# Patient Record
Sex: FEMALE | Race: WHITE
[De-identification: names, ages, dates, MRNs, and addresses within clinical notes are randomized per-mention and may not be internally consistent; named-entity substitution may affect disease eponyms.]

---

## 2019-03-28 ENCOUNTER — HOSPITAL ENCOUNTER (OUTPATIENT)
Dept: HOSPITAL 75 - RAD FS | Age: 34
End: 2019-03-28
Attending: PHYSICIAN ASSISTANT
Payer: MEDICAID

## 2019-03-28 DIAGNOSIS — R10.11: Primary | ICD-10-CM

## 2019-03-28 DIAGNOSIS — Z90.49: ICD-10-CM

## 2019-03-28 PROCEDURE — 74177 CT ABD & PELVIS W/CONTRAST: CPT

## 2019-03-28 NOTE — DIAGNOSTIC IMAGING REPORT
PROCEDURE: CT abdomen and pelvis with contrast.



TECHNIQUE: Multiple contiguous axial images were obtained through

the abdomen and pelvis after administration of intravenous

contrast. Auto Exposure Controls were utilized during the CT exam

to meet ALARA standards for radiation dose reduction. 



DATE: March 28, 2019.



COMPARISON: CT chest, abdomen and pelvis, January 28, 2014. 



INDICATION: 33-year-old female, right upper quadrant abdominal

pain for one year.



FINDINGS: There is minimal dependent atelectasis in the lower

lobes. The heart is not enlarged. There is no identified

pericardial effusion.



The liver is normal in size and contour. There is an elongated

left lobe of the liver which is a normal variant appearance.

There is no identified liver lesion. The main, right and left

portal veins are patent. The patient is status post

cholecystectomy. There is no intrahepatic or extrahepatic bile

duct dilation. The main pancreatic duct is not abnormally

dilated. Unremarkable appearance of the pancreatic parenchyma.

The spleen is normal in size. The adrenal glands are

unremarkable.



There is a 5 mm low-attenuation right renal lesion on delayed

axial image 45, too small to characterize. Urinary collecting

systems are not distended. There is no identified renal or

ureteral stone. The urinary bladder is grossly unremarkable in

appearance.



The uterus is not seen and may be surgically absent.



There are surgical clips in the right lower quadrant. The

appendix is not well seen. There are no secondary findings to

suggest acute appendicitis. There is no free intraperitoneal air.

There is no drainable fluid collection. There is no free pelvic

fluid.



There is no identified abnormally enlarged lymph node in the

abdomen or pelvis which meets CT size criteria for adenopathy.



There is no identified acute bony abnormality.



IMPRESSION: 

CT abdomen and pelvis: No identified acute abnormality in the

abdomen or pelvis.



Dictated by: 



  Dictated on workstation # [id number]

## 2019-04-13 ENCOUNTER — HOSPITAL ENCOUNTER (EMERGENCY)
Dept: HOSPITAL 75 - ER FS | Age: 34
Discharge: HOME | End: 2019-04-13
Payer: MEDICAID

## 2019-04-13 VITALS — DIASTOLIC BLOOD PRESSURE: 68 MMHG | SYSTOLIC BLOOD PRESSURE: 101 MMHG

## 2019-04-13 VITALS — HEIGHT: 64 IN | WEIGHT: 150 LBS | BODY MASS INDEX: 25.61 KG/M2

## 2019-04-13 DIAGNOSIS — R07.9: Primary | ICD-10-CM

## 2019-04-13 DIAGNOSIS — Z88.7: ICD-10-CM

## 2019-04-13 DIAGNOSIS — Z88.5: ICD-10-CM

## 2019-04-13 DIAGNOSIS — Z90.710: ICD-10-CM

## 2019-04-13 DIAGNOSIS — Z87.440: ICD-10-CM

## 2019-04-13 DIAGNOSIS — Z87.448: ICD-10-CM

## 2019-04-13 DIAGNOSIS — F32.9: ICD-10-CM

## 2019-04-13 DIAGNOSIS — F41.9: ICD-10-CM

## 2019-04-13 DIAGNOSIS — Z87.442: ICD-10-CM

## 2019-04-13 DIAGNOSIS — Z88.8: ICD-10-CM

## 2019-04-13 DIAGNOSIS — Z88.6: ICD-10-CM

## 2019-04-13 DIAGNOSIS — J45.909: ICD-10-CM

## 2019-04-13 DIAGNOSIS — Z87.19: ICD-10-CM

## 2019-04-13 PROCEDURE — 71046 X-RAY EXAM CHEST 2 VIEWS: CPT

## 2019-04-13 NOTE — XMS REPORT
Continuity of Care Document

 Created on: 2013



MARA DESIR

External Reference #: [id number]

: 1985

Sex: Female



Demographics







 Address  [address] [place]  [address]

 

 Home Phone  [phone]

 

 Preferred Language  Unknown

 

 Marital Status  Unknown

 

 Hinduism Affiliation  Unknown

 

 Race  Unknown

 

 Ethnic Group  Unknown





Author







 Author  [facility]

 

 Organization  [facility]

 

 Address  Unknown

 

 Phone  Unavailable







Care Team Providers







 Care Team Member Name  Role  Phone

 

 NO, LOCAL PHYSICIAN  PP  Unavailable



                                            



Insurance Providers

                      





 Payer Name                    Policy Number                    Subscriber Name
                    Relationship                

 

 Unknown                                         Mara Desir             
                        



                                                                    



Advance Directives

                      





 Directive                    Response                    Recorded Date        
        

 

 Advance Directives                    N                    13 6:09pm    
            



                                                                               
         



Problems

          No Known Problems or Medical conditions.                             
                                       



Social History

                      





 History                    Response                    Recorded Date/Time     
           

 

 Alcohol Use                    Denies Use                    13 6:09pm  
              

 

 Recreational Drug Use                    N                    13 6:09pm 
               



                                                                               
         



Allergies, Adverse Reactions, Alerts

                      





 Allergen                    Type                    Severity                   
 Reaction                    Last Updated                

 

 acetaminophen                    Allergy                                      
                        13                

 

 DEPROVERA                    Allergy                                          
                    13                



                                                                               
                             



Medications

                      





 Medication                    Dose                    Units                    
Route                    Sig                    Qty                    Days    
            

 

 Ciprofloxacin (Cipro)                    1                    Tab             
       PO                    BID                                         5     
           

 

 Citalopram Hydrobromide (Celexa)                    1                    Each 
                   PO                    DAILY                                 
                         

 

 Alprazolam (Xanax)                    1                    Tab                
    PO                    QID PRN                                              
            



                                                                               
                   



Pregnancy

                      





 Response                    Recorded Date/Time                

 

 Status not known                    Unknown                



                                                                              



Results

          No Known Relevant Diagnostic Tests, Laboratory Data and/or Discharge 
Summary.                                                                    



Encounters

                      





 Encounter                    Location                    Date/Time            
    

 

 Registered Emergency Room                    [facility] [place] [place] [facility]                    
13 5:49pm

## 2019-04-13 NOTE — XMS REPORT
[facility]

 Created on: 2019



Mara Desir

External Reference #: [id number]

: 1985

Sex: Female



Demographics







 Address  [address] [place]  [address]

 

 Preferred Language  Unknown

 

 Marital Status  Unknown

 

 Mandaen Affiliation  Unknown

 

 Race  Unknown

 

 Ethnic Group  Unknown





Author







 Author  Migration,  Doctor

 

 Organization  [facility]

 

 Address  Unknown

 

 Phone  Unavailable







Care Team Providers







 Care Team Member Name  Role  Phone

 

 Migration,  Doctor  Unavailable  Unavailable







PROBLEMS







 Type  Condition  ICD9-CM Code  HSA70-SL Code  Onset Dates  Condition Status  
SNOMED Code

 

 Problem  Special screening for malignant neoplasms, vagina  V76.47        
Active  978756248

 

 Problem  Unspecified breast screening  V76.10        Active  [id number]

 

 Problem  Moderate episode of recurrent major depressive disorder     F33.1     
Active  322467244

 

 Problem  Chronic obstructive pulmonary disease with acute lower respiratory 
infection     J44.0     Active  [id number]

 

 Problem  Screening examination for venereal disease  V74.5        Active  
[id number]

 

 Problem  Other abnormal blood chemistry  790.6        Active  318766681

 

 Problem  Renal and perinephric abscess  590.2        Active  205687924

 

 Problem  Acute pyelonephritis without lesion of renal medullary necrosis  
590.10        Active  401160302







ALLERGIES

No Information



ENCOUNTERS







 Encounter  Location  Date  Diagnosis

 

 [address] [address]  28 
Mar, 2019  Moderate episode of recurrent major depressive disorder F33.1

 

 [address] [address]  22 
Mar, 2019  Right upper quadrant abdominal pain R10.11

 

 [place] [id number] [place] [address]  04 Mar, 2019  Moderate episode of recurrent major depressive disorder 
F33.1

 

 [address] [address]    Moderate episode of recurrent major depressive disorder F33.1 ; 
Acute bronchitis, unspecified J20.9 and Chronic obstructive pulmonary disease 
with acute lower respiratory infection J44.0

 

 [facility]  [id number] [address]     

 

 [facility]  [address] [id number] [address]     

 

 [facility] [place] [facility]  [address] [id number] [place] [address]  13 Aug, 2014   

 

 [facility] [place] [facility]  [address] [id number] [place] [address]  13 Aug, 2014   

 

 [facility] [place] [facility]  [address] [id number] [place] [address]  ,    

 

 [facility] [place] [facility]  [address] [id number] [place] [address]  ,    

 

 [facility] [place] [facility]  [address] [id number] [place] [address]
32[address]  11 Oct, 2013   

 

 [facility] [place] [facility]  [address] [id number] [place] [address]
5[address]  11 Oct, 2013   

 

 [facility] [place] [facility]  [address] [id number] [place] [address]  09 Oct, 2013   

 

 [facility] [place] [facility]  [address] [id number] [place] [address]
64[address]  09 Oct, 2013   

 

 [facility] [place] [facility]  [address] [id number] [place] [address]
1406  07 Oct, 2013   

 

 [facility] [place] [facility]  [address] [id number] [place] [address]-
5590  04 Oct, 2013   

 

 [facility] [place] [facility]  [address] [id number] [place] [address]-
0054  03 Oct, 2013   

 

 [facility] [place] [facility]  [address] [id number] [place] [address]-
0942  02 Oct, 2013   

 

 [facility] [place] [facility]  [address] [id number] [place] [address]-
1[address]  30 Sep, 2013   

 

 [facility] [place] [facility]  [address] [id number] [place] [address]-
0126  09 Aug, 2013   

 

 [facility] [place] [facility]  [address] [id number] [place] [address]-
8756  08 Aug, 2013   

 

 [facility] [place] [facility]  [address] [id number] [place] [address]-
7455  07 Aug, 2013   

 

 [facility]  [address] [facility] [id number] [place] [address]-
2566  23 Aug, 2012   

 

 [facility]  [address] [facility] [id number] [place] [address]-
1256     







IMMUNIZATIONS

No Known Immunizations



SOCIAL HISTORY

Never Assessed



REASON FOR VISIT

[facility] [place]



PLAN OF CARE





VITAL SIGNS





MEDICATIONS







 Medication  Instructions  Dosage  Frequency  Start Date  End Date  Duration  
Status

 

 Flagyl 500 mg     1 tablet by Oral route 2 times per day for 7 days     09 Oct
, 2013        Active

 

 Ibuprofen 200 mg           08 Aug, 2013        Active







RESULTS

No Results



PROCEDURES

No Known procedures



INSTRUCTIONS





MEDICATIONS ADMINISTERED

No Known Medications



MEDICAL (GENERAL) HISTORY







 Type  Description  Date

 

 Medical History  Anxiety   

 

 Medical History  Depression   

 

 Medical History  emphysema   

 

 Medical History  COPD   

 

 Surgical History  hysterectomy   

 

 Surgical History  appendectomy   

 

 Surgical History  cholecystectomy   

 

 Surgical History  kidney stones, removed, stents placed & removed   

 

 Surgical History  ectopic pregnancy   

 

 Hospitalization History  surgeries   

 

 Hospitalization History  kidneys   

 

 Hospitalization History  kidney disease x2 weeks [place]

## 2019-04-13 NOTE — XMS REPORT
Continuity of Care Document

 Created on: 07/10/2013



MARA DESIR

External Reference #: [id number]

: 1985

Sex: Female



Demographics







 Address  [address] [place]  [address]

 

 Home Phone  [phone]

 

 Preferred Language  Unknown

 

 Marital Status  Unknown

 

 Druze Affiliation  Unknown

 

 Race  Unknown

 

 Ethnic Group  Unknown





Author







 Author  [facility]

 

 Organization  [facility]

 

 Address  Unknown

 

 Phone  Unavailable







Care Team Providers







 Care Team Member Name  Role  Phone

 

 NO, LOCAL PHYSICIAN  PP  Unavailable



                                            



Insurance Providers

                      





 Payer Name                    Policy Number                    Subscriber Name
                    Relationship                

 

 Self Pay                                         Mara Desir            
        01 Self / Same As Patient                



                                                                    



Advance Directives

                      





 Directive                    Response                    Recorded Date        
        

 

 Advance Directives                    N                    07/10/13 6:04pm    
            



                                                                               
         



Problems

          No Known Problems or Medical conditions.                             
                                       



Social History

                      





 History                    Response                    Recorded Date/Time     
           

 

 Alcohol Use                    Denies Use                    07/10/13 6:04pm  
              

 

 Recreational Drug Use                    Y POT                    07/10/13 6:
04pm                



                                                                               
         



Allergies, Adverse Reactions, Alerts

                      





 Allergen                    Type                    Severity                   
 Reaction                    Last Updated                

 

 acetaminophen                    Allergy                                      
                        13                

 

 DEPROVERA                    Allergy                                          
                    13                



                                                                               
                             



Medications

                      





 Medication                    Dose                    Units                    
Route                    Sig                    Qty                    Days    
            

 

 Ondansetron HCl (Zofran Oral Dissolve)                    4                    
Mg                    PO                    Q4H                    5           
                          

 

 Tramadol HCl (Ultram)                    50                    Mg             
       PO                    Q4-6HOURS PRN                    20               
                      

 

 Levofloxacin (Levaquin)                    750                    Mg          
          PO                    DAILY                    7                     
                

 

 Metronidazole (Flagyl 500 Mg)                    1                    Each    
                PO                    BID                                      
   7                

 

 Ciprofloxacin (Cipro)                    1                    Tab             
       PO                    BID                                         5     
           

 

 Citalopram Hydrobromide (Celexa)                    1                    Each 
                   PO                    DAILY                                 
                         

 

 Alprazolam (Xanax)                    1                    Tab                
    PO                    QID PRN                                              
            



                                                                               
                                                           



Pregnancy

                      





 Response                    Recorded Date/Time                

 

 Status not known                    Unknown                



                                                                              



Results

                      





 Test                    Date                    Result                    
Interp.                    Ref. Range                

 

 Urine Bacteria                    May 26, 2013 6:22pm                    TRACE
  /HPF                                         -                

 

 Urine Bilirubin                    May 26, 2013 6:22pm                    
NEGATIVE                                         -                

 

 Urine Casts                    May 26, 2013 6:22pm                    NONE  /
LPF                                         -                

 

 Urine Clarity                    May 26, 2013 6:22pm                    CLEAR 
                                        -                

 

 Urine Color                    May 26, 2013 6:22pm                    YELLOW  
                                       -                

 

 Urine Crystals                    May 26, 2013 6:22pm                    NONE  
/LPF                                         -                

 

 Urine Culture Indicated                    May 26, 2013 6:22pm                
    YES                                         -                

 

 Urine Glucose (UA)                    May 26, 2013 6:22pm                    
NEGATIVE                                         -                

 

 Urine Ketones                    May 26, 2013 6:22pm                    
NEGATIVE                                         -                

 

 Urine Leukocyte Esterase                    May 26, 2013 6:22pm               
     TRACE                    H                    -                

 

 Urine Mucus                    May 26, 2013 6:22pm                    SMALL  /
LPF                    H                    -                

 

 Urine Nitrite                    May 26, 2013 6:22pm                    
NEGATIVE                                         -                

 

 Urine Protein                    May 26, 2013 6:22pm                    
NEGATIVE                                         -                

 

 Urine RBC                    May 26, 2013 6:22pm                    0-2  /HPF 
                                        -                

 

 Urine Specific Gravity                    May 26, 2013 6:22pm                 
   1.030                    H                    -                

 

 Urine Squamous Epithelial Cells                    May 26, 2013 6:22pm        
            5-10  /HPF                                         -                

 

 Urine Urobilinogen                    May 26, 2013 6:22pm                    
NORMAL  MG/DL                                         -                

 

 Urine WBC                    May 26, 2013 6:22pm                    5-10  /HPF
                    H                    -                

 

 Urine White Blood Cell Casts                    May 26, 2013 6:22pm           
           /LPF                                         -                

 

 Urine pH                    May 26, 2013 6:22pm                    5          
                               -                

 

 Urine RBC (Auto)                    May 26, 2013 6:22pm                    1+ 
                   H                    -                



                                                                               
                                                                               
                                                                               
                               



Procedures

                      





 Procedure                    Code                    Date                

 

 Urine Culture                                         13                



                                                                              



Encounters

                      





 Encounter                    Location                    Date/Time            
    

 

 Registered Emergency Room                    [facility]                    
07/10/13 5:53pm                

 

 Departed Emergency Room                    [facility]                     5:49pm
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
## 2019-04-13 NOTE — XMS REPORT
Continuity of Care Document

 Created on: 2013



CASSIDY DESIR

External Reference #: [id number]

: 1985

Sex: Female



Demographics







 Address  [address] [place]  [address]

 

 Home Phone  [phone]

 

 Preferred Language  Unknown

 

 Marital Status  Unknown

 

 Mosque Affiliation  Unknown

 

 Race  Unknown

 

 Ethnic Group  Unknown





Author







 Author  [facility]

 

 Organization  [facility]

 

 Address  Unknown

 

 Phone  Unavailable







Support







 Name  Relationship  Address  Phone

 

 COLT DESIR  Next Of Kin  [address]

[place]  [address] [phone]







Care Team Providers







 Care Team Member Name  Role  Phone

 

 NO, LOCAL PHYSICIAN  PP  Unavailable



                                            



Insurance Providers

                      





 Payer Name                    Policy Number                    Subscriber Name
                    Relationship                

 

 Self Pay                                         Cassidy Desir            
        01 Self / Same As Patient                



                                                                    



Advance Directives

                      





 Directive                    Response                    Recorded Date        
        

 

 Advance Directives                    N                    13 11:45pm   
             

 

 Health Care Power of                     N                    13 
11:45pm                

 

 Organ Donor                    Y                    13 11:45pm          
      



                                                                               
         



Problems

          No Known Problems or Medical conditions.                             
                                       



Family History

                      





 History                    Response                    Recorded Date/Time     
           

 

 Hx Family Cancer                    Y UNCLE-BRAIN, TWO GRANDMOTHERS           
         13 11:45pm                

 

 Hx Family Breast Cancer                    N                    13 11:
45pm                

 

 Hx Family Lung Cancer                    N                    13 11:45pm
                

 

 Hx Family Colorectal Cancer                    N                    13 11
:45pm                

 

 Hx Family Cardiac Disorders                    Y UNCLE--HEART TRANSPLANT      
              13 11:45pm                

 

 Hx Family Stroke                    N                    13 11:45pm     
           

 

 Hx Family Hypertension                    N                    13 11:
45pm                

 

 Hx Family Myocardial Infarction                    N                     11:45pm                

 

 Hx Family Cystic Fibrosis                    N                    13 11:
45pm                



                                                                    



Social History

                      





 History                    Response                    Recorded Date/Time     
           

 

 Alcohol Use                    Denies Use                    13 11:45pm 
               

 

 Recreational Drug Use                    Y [employer]CC[employer]IONAL MARIJUANA              
      13 11:45pm                

 

 Recent Foreign Travel                    N                    13 11:45pm
                

 

 Recent Infectious Disease Exposure                    N                     11:45pm                

 

 Hospitalization with Isolation                    Denies                     2:14pm                

 

 Sexually Transmitted Disease                    N                    13 
11:45pm                

 

 HIV/AIDS                    N                    13 11:45pm             
   



                                                                               
         



Allergies, Adverse Reactions, Alerts

                      





 Allergen                    Type                    Severity                   
 Reaction                    Last Updated                

 

 acetaminophen                    Allergy                                      
                        13                

 

 DEPROVERA                    Allergy                                          
                    13                

 

 oxycodone                    Adverse Reaction                    Intermediate 
                                        13                



                                                                               
                                       



Medications

                      





 Medication                    Dose                    Units                    
Route                    Sig                    Qty                    Days    
            

 

 Trimethoprim/Sulfamethoxazole (Bactrim Ds)                    1               
     Tab                    PO                    BID                          
                                

 

 Ibuprofen (Advil)                    800                    Mg                
    PO                    BID PRN                                              
            

 

 Trimethoprim/Sulfamethoxazole (Bactrim Ds)                    1               
     Ea                    PO                    BID                           
                               

 

 Metronidazole (Flagyl 500 Mg)                    1                    Each    
                PO                    BID                                      
   7                

 

 Ciprofloxacin (Cipro)                    1                    Tab             
       PO                    BID                                         5     
           

 

 Citalopram Hydrobromide (Celexa)                    1                    Each 
                   PO                    DAILY                                 
                         

 

 Alprazolam (Xanax)                    1                    Tab                
    PO                    QID PRN                                              
            



                                                                               
                                                           



Immunizations

                      





 Name                    Given                    Type                

 

 Date of Pneumonia Vaccine                    13                    H    
            

 

 pneumococcal polysaccharide PPV23                    13                 
   A                

 

 pneumococcal polysaccharide PPV23                    13                 
   A                



                                                                               
                   



Pregnancy

                      





 Response                    Recorded Date/Time                

 

 Status not known                    Unknown                



                                                                              



Results

                      





 Test                    Date                    Result                    
Interp.                    Ref. Range                

 

 Alanine Aminotransferase (ALT/SGPT)                    2013 9:20pm   
                 33  U/L                    N                    30-65         
       

 

 Albumin                    2013 9:20pm                    3.5  G/DL  
                  N                    3.4-5.0                

 

 Alkaline Phosphatase                    2013 9:20pm                  
  124  U/L                    N                                    

 

 Amylase Level                    July 10, 2013 7:50pm                    35  U/
L                    N                                    

 

 Aspartate Amino Transf (AST/SGOT)                    2013 9:20pm     
               29  U/L                    N                    15-37           
     

 

 BUN/Creatinine Ratio                    2013 9:20pm                  
  16                                         -                

 

 Band Neutrophils                    2013 9:20pm                    1  
%                                         -                

 

 Basophils # (Auto)                    2013 9:20pm                    
0.0  10^3/uL                    N                    0.0-0.1                

 

 Basophils % (Manual)                    2013 9:20pm                  
  0  %                                         -                

 

 Basophils (%) (Auto)                    2013 9:20pm                  
  0  %                    N                    0-10                

 

 Blood Urea Nitrogen                    2013 9:20pm                    
18  MG/DL                    N                    7-18                

 

 Calcium Level                    2013 9:20pm                    8.9  
MG/DL                    N                    8.5-10.1                

 

 Carbon Dioxide Level                    2013 9:20pm                  
  28  MMOL/L                    N                    21-32                

 

 Chloride Level                    2013 9:20pm                    96  
MMOL/L                    L                    101-110                

 

 Creatinine                    2013 9:20pm                    1.1  MG/
DL                    N                    0.6-1.3                

 

 Eosinophils # (Auto)                    2013 9:20pm                  
  0.0  10^3/uL                    N                    0.0-0.3                

 

 Eosinophils % (Manual)                    2013 9:20pm                
    0  %                                         -                

 

 Eosinophils (%) (Auto)                    2013 9:20pm                
    0  %                    N                    0-10                

 

 Glucose Level                    2013 9:20pm                    101  
MG/DL                    N                                    

 

 Hematocrit                    2013 9:20pm                    43  %   
                 N                    35-52                

 

 Hemoglobin                    2013 9:20pm                    14.8  G/
DL                    N                    11.5-16.0                

 

 Lipase                    July 10, 2013 7:50pm                    127  U/L    
                N                                    

 

 Lymphocytes # (Auto)                    2013 9:20pm                  
  2.0  X 10^3                    N                    1.0-4.0                

 

 Lymphocytes % (Manual)                    2013 9:20pm                
    24  %                                         -                

 

 Lymphocytes (%) (Auto)                    2013 9:20pm                
    12  %                    N                    12-44                

 

 Mean Corpuscular Hemoglobin                    2013 9:20pm           
         31  PG                    N                    25-34                

 

 Mean Corpuscular Hemoglobin Concent                    2013 9:20pm   
                 35  G/DL                    N                    32-36        
        

 

 Mean Corpuscular Volume                    2013 9:20pm               
     90  FL                    N                    80-99                

 

 Mean Platelet Volume                    2013 9:20pm                  
  9.4  FL                    N                    7.4-10.4                

 

 Monocytes # (Auto)                    2013 9:20pm                    
2.0  X 10^3                    H                    0.0-1.0                

 

 Monocytes % (Manual)                    2013 9:20pm                  
  6  %                                         -                

 

 Monocytes (%) (Auto)                    2013 9:20pm                  
  12  %                    N                    0-12                

 

 Neutrophils # (Auto)                    2013 9:20pm                  
  12.2  X 10^3                    H                    1.8-7.8                

 

 Neutrophils % (Manual)                    2013 9:20pm                
    67  %                                         -                

 

 Neutrophils (%) (Auto)                    2013 9:20pm                
    75  %                    N                    42-75                

 

 Platelet Count                    2013 9:20pm                    187  
10^3/uL                    N                    130-400                

 

 Potassium Level                    2013 9:20pm                    4.1
  MMOL/L                    N                    3.6-5.0                

 

 Reactive Lymphocytes                    2013 9:20pm                  
  2  %                                         -                

 

 Red Blood Count                    2013 9:20pm                    
4.76  10^6/uL                    N                    4.35-5.85                

 

 Red Cell Distribution Width                    2013 9:20pm           
         12.5  %                    N                    10.0-14.5             
   

 

 Sodium Level                    2013 9:20pm                    132  
MMOL/L                    L                    135-145                

 

 Total Bilirubin                    2013 9:20pm                    0.4
  MG/DL                    N                    0.0-1.0                

 

 Total Protein                    2013 9:20pm                    8.1  G
/DL                    N                    6.4-8.2                

 

 Ur Tricyclic Antidepressants Screen                    July 10, 2013 6:17pm   
                 NEGATIVE                                         -            
    

 

 Urine Amphetamines Screen                    July 10, 2013 6:17pm             
       NEGATIVE                                         -                

 

 Urine Bacteria                    2013 9:02pm                    
LARGE  /HPF                    H                    -                

 

 Urine Barbiturates Screen                    July 10, 2013 6:17pm             
       NEGATIVE                                         -                

 

 Urine Benzodiazepines Screen                    July 10, 2013 6:17pm          
          NEGATIVE                                         -                

 

 Urine Bilirubin                    2013 9:02pm                    
NEGATIVE                                         -                

 

 Urine Casts                    2013 9:02pm                    NONE  /
LPF                                         -                

 

 Urine Clarity                    2013 9:02pm                    VERY 
CLOUDY                    H                    -                

 

 Urine Cocaine Screen                    July 10, 2013 6:17pm                  
  NEGATIVE                                         -                

 

 Urine Color                    2013 9:02pm                    MATTHEW  
                  H                    -                

 

 Urine Crystals                    2013 9:02pm                    NONE
  /LPF                                         -                

 

 Urine Culture Indicated                    2013 9:02pm               
     YES                                         -                

 

 Urine Glucose (UA)                    2013 9:02pm                    
NEGATIVE                                         -                

 

 Urine Ketones                    2013 9:02pm                    
NEGATIVE                                         -                

 

 Urine Leukocyte Esterase                    2013 9:02pm              
      2+                    H                    -                

 

 Urine Methamphetamines Screen                    July 10, 2013 6:17pm         
           NEGATIVE                                         -                

 

 Urine Mucus                    2013 9:02pm                    
NEGATIVE  /LPF                                         -                

 

 Urine Nitrite                    2013 9:02pm                    
POSITIVE                    H                    -                

 

 Urine Opiates Screen                    July 10, 2013 6:17pm                  
  POSITIVE                    H                    -                

 

 Urine Phencyclidine Screen                    July 10, 2013 6:17pm            
        NEGATIVE                                         -                

 

 Urine Propoxyphene Screen                    July 10, 2013 6:17pm             
       NEGATIVE                                         -                

 

 Urine Protein                    2013 9:02pm                    3+   
                 H                    -                

 

 Urine RBC                    2013 9:02pm                    2-5  /HPF
                    H                    -                

 

 Urine Specific Gravity                    2013 9:02pm                
    1.025                    H                    -                

 

 Urine Squamous Epithelial Cells                    2013 9:02pm       
             2-5  /HPF                                         -                

 

 Urine Urobilinogen                    2013 9:02pm                    
1  MG/DL                                         -                

 

 Urine WBC                    2013 9:02pm                      /
HPF                    H                    -                

 

 Urine White Blood Cell Casts                    May 26, 2013 6:22pm           
           /LPF                                         -                

 

 Urine pH                    2013 9:02pm                    5         
                                -                

 

 White Blood Count                    2013 9:20pm                    
16.3  10^3/uL                    H                    4.3-11.0                

 

 Estimat Glomerular Filtration Rate                    2013 9:20pm    
                58                                         -                

 

 Urine Oxycodone Screen                    July 10, 2013 6:17pm                
    NEGATIVE                                         -                

 

 Blood Morphology Comment                    2013 9:20pm              
      NORMAL                                         -                

 

 Urine Methadone Screen                    July 10, 2013 6:17pm                
    NEGATIVE                                         -                

 

 Urine Cannabinoids Screen                    July 10, 2013 6:17pm             
       POSITIVE                    H                    -                

 

 Urine Buprenorphine                    July 10, 2013 6:17pm                    
NEGATIVE                                         -                

 

 Urine RBC (Auto)                    2013 9:02pm                    5+
                    H                    -                



                                                                               
                                                                               
                                                                               
                                                                               
                                                                               
                                                                               
                                                                               
                                                                               
                                                                               
                                                                               
                                                                    



Procedures

                      





 Procedure                    Code                    Date                

 

 Blood Culture                                         07/10/13                

 

 Urine Culture                                         07/10/13                



                                                                               
         



Encounters

                      





 Encounter                    Location                    Date/Time            
    

 

 Discharged Inpatient                    [facility]                     10:49pm                

 

 Departed Emergency Room                    [facility]                    07/
10/13 5:53pm

## 2019-04-13 NOTE — ED GENERAL
General


Chief Complaint:  Respiratory Problems


Stated Complaint:  SOB, CHEST PAIN


Nursing Triage Note:  


REPORTS SHE WAS SITTING AT A BASEBALL GAME AND SUDDENLY HAD LEFT RIB PAIN AND 


SHORTNESS OF BREATH.  SHE WENT TO SIT IN THE CAR AND THE PAIN WENT AWAY BUT 


REPORTS IT SCARED HER.


Nursing Sepsis Screen:  No Definite Risk


Source of Information:  Patient


Exam Limitations:  No Limitations





History of Present Illness


Date Seen by Provider:  Apr 13, 2019


Time Seen by Provider:  14:19


Initial Comments


Patient was sitting down when she suddenly experienced severe sharp pain in her 

left mid ribs.  Pain doubled her over and was associated with shortness of 

breath.  She denies any trauma.  Pain is gone now.  She denies fevers chills or 

cough.  She does smoke.





Allergies and Home Medications


Allergies


Coded Allergies:  


     acetaminophen (Verified  Allergy, Unknown, 3/28/19)


 


 PT DENIES ALLERGY, CITING, "I CAN TAKE LORCET, I CANNOT


 


 TAKE PERCOSET.


Uncoded Allergies:  


     DEPROVERA (Allergy, Unknown, 3/28/19)


     oxycodone (Adverse Reaction, Intermediate, 8/1/13)


 


 pt states, "i'm not really allergic to tylenol, i am


 


 allergic to percoset.


 


 WHEN I TOOK PERCOSET I BECAME VERY ANXIOUS, AND NERVOUS,


 


 WAS CLIMBING THE WALLS, BUT I CAN TAKE LORCET WITH NO


 


 PROBLEM, SO IT'S NOT TYLENOL. IT'S OXYCODONE THAT I AM


 


 ALLERGIC TO.





Home Medications


Hydrocodone Bit/Acetaminophen 1 Each Tablet, 1 EA PO Q6H PRN for MILD PAIN


   Prescribed by: BRUCE CHI on 4/18/14 4050


Ibuprofen 800 Mg Tablet, 800 MG PO q8h PRN for PAIN, (Reported)





Review of Systems


Review of Systems


Constitutional:  no symptoms reported


Respiratory:  short of breath


Cardiovascular:  chest pain


Musculoskeletal:  no symptoms reported


Skin:  no symptoms reported





All Other Systems Reviewed


Negative Unless Noted:  Yes





Past Medical-Social-Family Hx


Patient Social History


Alcohol Use:  Denies Use


Recreational Drug Use:  No


Recent Foreign Travel:  No (N)


Contact w/Someone Who Travel:  No


Recent Infectious Disease Expo:  No


Physical Abuse:  No


Sexual Abuse:  No


Mistreated:  No


Fear:  No





Immunizations Up To Date


Date of Pneumonia Vaccine:  Aug 2, 2013





Seasonal Allergies


Seasonal Allergies:  No





Past Medical History


Asthma


GYN History:  Hysterectomy


Sexually Transmitted Disease:  No


HIV/AIDS:  No


Bladder Infection, Kidney Stones, UTI-Chronic


Chronic Diarrhea, Gall Bladder Disease


Chronic Back Pain


Cervical, Uterine


Anxiety, Depression


Adverse Reaction/Blood Tranf:  No





Family Medical History


No Pertinent Family Hx





Physical Exam


Vital Signs





Vital Signs - First Documented








 4/13/19





 13:50


 


Temp 97.8


 


Pulse 82


 


Resp 18


 


B/P (MAP) 121/75 (90)


 


O2 Delivery Room Air





Capillary Refill : Less Than 3 Seconds


Height, Weight, BMI


Height: 5'4.00"


Weight: 150lbs. oz. 68.959359gw;  BMI


Method:Stated


General Appearance:  WD/WN, Anxious


HEENT:  PERRL/EOMI, Pharynx Normal


Neck:  Supple


Respiratory:  Chest Non Tender (no crepitus), Lungs Clear, Normal Breath Sounds


Cardiovascular:  Regular Rate, Rhythm, No Edema


Gastrointestinal:  Soft


Back:  Normal Inspection


Neurologic/Psychiatric:  Alert, No Motor/Sensory Deficits, Normal Mood/Affect


Skin:  Normal Color, Warm/Dry





Progress/Results/Core Measures


Suspected Sepsis


Recent Fever Within 48 Hours:  No


Infection Criteria Present:  None


New/Unexplained  Altered Menta:  No


Sepsis Screen:  No Definite Risk


SIRS


Temperature:97.8 


Pulse: 82 


Respiratory Rate: 18


 


Blood Pressure 121 /75 


Mean: 90





Results/Orders


My Orders





Orders - RULA RIZVI MD


Ekg Tracing (4/13/19 14:06)


Chest Pa/Lat (2 View) (4/13/19 14:06)





Vital Signs/I&O











 4/13/19





 13:50


 


Temp 97.8


 


Pulse 82


 


Resp 18


 


B/P (MAP) 121/75 (90)


 


O2 Delivery Room Air





Capillary Refill : Less Than 3 Seconds








Blood Pressure Mean:  90








Progress Note :  


   Time:  14:29


Progress Note


Symptoms were brief.  Now resolved.  EKG normal chest x-ray normal.  Well's 

criteria and PERC rule 0.  Observe here for an hour.  discharge if stable.





ECG


Initial ECG Impression Date:  Apr 13, 2019


Initial ECG Impression Time:  14:21


Initial ECG Rate:  89


Initial ECG Intervals:  Normal


Initial ECG Intervals


no ischemia or signs of pericarditis


Initial ECG Impression:  Normal





Diagnostic Imaging





   Diagonstic Imaging:  Xray


   Plain Films/CT/US/NM/MRI:  chest


Comments


nad





Departure


Impression





 Primary Impression:  


 Chest pain not due to acute coronary syndrome


Disposition:  01 HOME, SELF-CARE


Condition:  Stable





Departure-Patient Inst.


Referrals:  


SHASHI TAPIA MD (PCP/Family)


Primary Care Physician


Patient Instructions:  Pleuritic Chest Pain (DC)





Add. Discharge Instructions:  


Rest.  Ibuprofen for pain.  Stop smoking.  Seek medical attention if pain 

recurs or is severe and persistent.  All discharge instructions reviewed with 

patient and/or family. Voiced understanding.











RULA RIZVI MD Apr 13, 2019 14:23

## 2019-04-13 NOTE — DIAGNOSTIC IMAGING REPORT
CLINICAL INDICATION: Patient sitting at a baseball game and

suddenly had left rib pain and shortness of breath.



EXAM: Chest x-ray PA and lateral views.



COMPARISONS: None.



FINDINGS:



Lungs/pleura: Lungs are clear. There is no pneumothorax. There is

no pleural effusion.



Mediastinum: Unremarkable. 



Pulmonary vasculature: Unremarkable.



Heart: Unremarkable.



Bones/extrathoracic soft tissue: Unremarkable.



IMPRESSION:

There is no radiographic evidence of acute cardiopulmonary

process.



Dictated by: 



  Dictated on workstation # [id number]

## 2019-08-09 ENCOUNTER — HOSPITAL ENCOUNTER (OUTPATIENT)
Dept: HOSPITAL 75 - RAD FS | Age: 34
End: 2019-08-09
Attending: FAMILY MEDICINE
Payer: MEDICAID

## 2019-08-09 DIAGNOSIS — M54.41: Primary | ICD-10-CM

## 2019-08-09 PROCEDURE — 72100 X-RAY EXAM L-S SPINE 2/3 VWS: CPT

## 2020-02-20 ENCOUNTER — HOSPITAL ENCOUNTER (EMERGENCY)
Dept: HOSPITAL 75 - ER FS | Age: 35
Discharge: HOME | End: 2020-02-20
Payer: COMMERCIAL

## 2020-02-20 VITALS — DIASTOLIC BLOOD PRESSURE: 75 MMHG | SYSTOLIC BLOOD PRESSURE: 115 MMHG

## 2020-02-20 VITALS — WEIGHT: 135.14 LBS | HEIGHT: 63.98 IN | BODY MASS INDEX: 23.07 KG/M2

## 2020-02-20 DIAGNOSIS — Z87.442: ICD-10-CM

## 2020-02-20 DIAGNOSIS — R11.2: ICD-10-CM

## 2020-02-20 DIAGNOSIS — R10.13: ICD-10-CM

## 2020-02-20 DIAGNOSIS — Z88.8: ICD-10-CM

## 2020-02-20 DIAGNOSIS — F17.200: ICD-10-CM

## 2020-02-20 DIAGNOSIS — R42: Primary | ICD-10-CM

## 2020-02-20 DIAGNOSIS — Z88.6: ICD-10-CM

## 2020-02-20 LAB
ALBUMIN SERPL-MCNC: 4.6 GM/DL (ref 3.2–4.5)
ALP SERPL-CCNC: 83 U/L (ref 40–136)
ALT SERPL-CCNC: 19 U/L (ref 0–55)
BASOPHILS # BLD AUTO: 0 10^3/UL (ref 0–0.1)
BASOPHILS NFR BLD AUTO: 0 % (ref 0–10)
BILIRUB SERPL-MCNC: 0.3 MG/DL (ref 0.1–1)
BUN/CREAT SERPL: 18
CALCIUM SERPL-MCNC: 9.7 MG/DL (ref 8.5–10.1)
CHLORIDE SERPL-SCNC: 101 MMOL/L (ref 98–107)
CO2 SERPL-SCNC: 26 MMOL/L (ref 21–32)
CREAT SERPL-MCNC: 0.67 MG/DL (ref 0.6–1.3)
EOSINOPHIL # BLD AUTO: 0.1 10^3/UL (ref 0–0.3)
EOSINOPHIL NFR BLD AUTO: 1 % (ref 0–10)
ERYTHROCYTE [DISTWIDTH] IN BLOOD BY AUTOMATED COUNT: 11.9 % (ref 10–14.5)
GFR SERPLBLD BASED ON 1.73 SQ M-ARVRAT: > 60 ML/MIN
GLUCOSE SERPL-MCNC: 100 MG/DL (ref 70–105)
HCT VFR BLD CALC: 43 % (ref 35–52)
HGB BLD-MCNC: 15.1 G/DL (ref 11.5–16)
LIPASE SERPL-CCNC: 30 U/L (ref 8–78)
LYMPHOCYTES # BLD AUTO: 2.3 X 10^3 (ref 1–4)
LYMPHOCYTES NFR BLD AUTO: 23 % (ref 12–44)
MANUAL DIFFERENTIAL PERFORMED BLD QL: NO
MCH RBC QN AUTO: 31 PG (ref 25–34)
MCHC RBC AUTO-ENTMCNC: 35 G/DL (ref 32–36)
MCV RBC AUTO: 90 FL (ref 80–99)
MONOCYTES # BLD AUTO: 0.6 X 10^3 (ref 0–1)
MONOCYTES NFR BLD AUTO: 6 % (ref 0–12)
NEUTROPHILS # BLD AUTO: 7.7 X 10^3 (ref 1.8–7.8)
NEUTROPHILS NFR BLD AUTO: 70 % (ref 42–75)
PLATELET # BLD: 370 10^3/UL (ref 130–400)
PMV BLD AUTO: 9 FL (ref 7.4–10.4)
POTASSIUM SERPL-SCNC: 4.4 MMOL/L (ref 3.6–5)
PROT SERPL-MCNC: 7.4 GM/DL (ref 6.4–8.2)
SODIUM SERPL-SCNC: 139 MMOL/L (ref 135–145)
WBC # BLD AUTO: 11.1 10^3/UL (ref 4.3–11)

## 2020-02-20 PROCEDURE — 83690 ASSAY OF LIPASE: CPT

## 2020-02-20 PROCEDURE — 80053 COMPREHEN METABOLIC PANEL: CPT

## 2020-02-20 PROCEDURE — 71045 X-RAY EXAM CHEST 1 VIEW: CPT

## 2020-02-20 PROCEDURE — 85025 COMPLETE CBC W/AUTO DIFF WBC: CPT

## 2020-02-20 PROCEDURE — 93005 ELECTROCARDIOGRAM TRACING: CPT

## 2020-02-20 PROCEDURE — 36415 COLL VENOUS BLD VENIPUNCTURE: CPT

## 2020-02-20 PROCEDURE — 84484 ASSAY OF TROPONIN QUANT: CPT

## 2020-02-20 NOTE — DIAGNOSTIC IMAGING REPORT
Indication: Vomiting x1 hour



Portable chest 1:35 PM



Heart size and pulmonary vascularity are normal. Lungs are clear.

There are no effusions or pneumothoraces.



IMPRESSION: Negative chest



Dictated by: 



  Dictated on workstation # [id number]

## 2020-02-20 NOTE — ED GENERAL
General


Chief Complaint:  Abdominal/GI Problems


Stated Complaint:  VOMITING,DIZZINESS


Nursing Triage Note:  


Patient presents to the ED with c/o vomiting, shakiness, and head spinning. She 


states that her symptoms began within the last hour and she has vomited x3.


Nursing Sepsis Screen:  No Definite Risk





History of Present Illness


Date Seen by Provider:  Feb 20, 2020


Time Seen by Provider:  12:23


Initial Comments


The patient is a 34-year-old female with a history of tobacco abuse and who is 

otherwise healthy. She presents with concern for acute onset of tingling of 

bilateral hands, shakiness, nausea with 2 episodes of nonbloody vomiting and 

generalized lightheadedness, all with onset about a half an hour prior to ar

rival after the patient reportedly got a phone call from her children's school 

requesting that she pick them up. Her sister is here with her and alludes to 

some new life stress which the patient does not elaborate on. Patient reported 

to me initially that it felt like her head was spinning but when I clarify she 

states that she just feels very lightheaded like she will pass out. She denies 

any trina vertigo. Patient states she was feeling well before the onset of 

symptoms. Associated mild epigastric discomfort with onset after the vomiting 

episodes. She denies fevers, hematemesis, hematochezia, melena, headache, focal 

weakness, focal numbness, neck stiffness/pain/meningismus, vision changes, 

shortness of breath or chest pain, flank pain, back pain, dysuria or hematuria, 

changes in bowel habits. Patient is alert and oriented and appropriately 

interactive and in no acute distress upon initial evaluation in the emergency 

department. Vital signs are appropriate here.





Allergies and Home Medications


Allergies


Coded Allergies:  


     acetaminophen (Verified  Allergy, Unknown, 3/28/19)


   


   PT DENIES ALLERGY, CITING, "I CAN TAKE LORCET, I CANNOT


   


   TAKE PERCOSET.


Uncoded Allergies:  


     DEPROVERA (Allergy, Unknown, 3/28/19)


     oxycodone (Adverse Reaction, Intermediate, 8/1/13)


   


   pt states, "i'm not really allergic to tylenol, i am


   


   allergic to percoset.


   


   WHEN I TOOK PERCOSET I BECAME VERY ANXIOUS, AND NERVOUS,


   


   WAS CLIMBING THE WALLS, BUT I CAN TAKE LORCET WITH NO


   


   PROBLEM, SO IT'S NOT TYLENOL. IT'S OXYCODONE THAT I AM


   


   ALLERGIC TO.





Home Medications


Hydrocodone Bit/Acetaminophen 1 Each Tablet, 1 EA PO Q6H PRN for MILD PAIN


   Prescribed by: BRUCE CHI on 4/18/14 6946


Ibuprofen 800 Mg Tablet, 800 MG PO q8h PRN for PAIN, (Reported)





Patient Home Medication List


Home Medication List Reviewed:  Yes





Review of Systems


Review of Systems


Constitutional:  see HPI





All Other Systems Reviewed


Negative Unless Noted:  Yes (Negative excepted noted.)





Past Medical-Social-Family Hx


Past Med/Social Hx:  Reviewed Nursing Past Med/Soc Hx


Patient Social History


Alcohol Use:  Denies Use


Recreational Drug Use:  Yes


Drug of Choice:  Marijuana


Smoking Status:  Current Everyday Smoker


2nd Hand Smoke Exposure:  No


Recent Foreign Travel:  No


Contact w/Someone Who Travel:  No


Recent Infectious Disease Expo:  No


Physical Abuse:  No


Sexual Abuse:  No


Mistreated:  No


Fear:  No





Immunizations Up To Date


Date of Pneumonia Vaccine:  Aug 2, 2013





Seasonal Allergies


Seasonal Allergies:  No





Past Medical History


Asthma


GYN History:  Hysterectomy


Sexually Transmitted Disease:  No


HIV/AIDS:  No


Bladder Infection, Kidney Stones, UTI-Chronic


Chronic Diarrhea, Gall Bladder Disease


Chronic Back Pain


Cervical, Uterine


Anxiety, Depression


Adverse Reaction/Blood Tranf:  No





Family Medical History


Reviewed Nursing Family Hx


No Pertinent Family Hx





Physical Exam


Vital Signs





Vital Signs - First Documented








 2/20/20





 12:29


 


Temp 37.1


 


Pulse 112


 


Resp 20


 


B/P (MAP) 125/80 (95)


 


Pulse Ox 95


 


O2 Delivery Room Air





Capillary Refill : Less Than 3 Seconds


Height, Weight, BMI


Height: 5'4.00"


Weight: 150lbs. oz. 68.270537za; 23.00 BMI


Method:Stated


General Appearance:  No Apparent Distress


Comments


This is a younger  female appearing nontoxic and in no acute distress. 

She appears anxious. Head is normocephalic and atraumatic. Neck is supple and 

nontender. Oropharynx is moist. Lungs are clear to auscultation at all stations.

There is a normal S1 and S2 without rubs or gallops and capillary refill is 

appropriate, less than 2 seconds globally. Abdomen is soft, nontender and with 

very mild epigastric tenderness to palpation without rebound or guarding. No 

right-sided or lower abdominal tenderness to palpation. Skin is warm and dry 

without cyanosis, clubbing or edema. Psychiatrically, the patient demonstrates 

appropriate mood and affect and is alert. Neurologically, cranial nerves II 

through XII are intact and there are no lateralizing deficits noted. Speech is 

normal. Language is normal. Coordination is normal. There is no dysmetria with 

finger to nose bilaterally. Strength is 5 out of 5 in all joints of bilateral 

upper and lower extremities. Sensation is intact to light touch in bilateral 

upper and lower extremities. Ambulation testing is deferred. Patient is alert 

and oriented 4.





Progress/Results/Core Measures


Suspected Sepsis


Recent Fever Within 48 Hours:  No


Infection Criteria Present:  Suspected New Infection


New/Unexplained  Altered Menta:  No


Sepsis Screen:  No Definite Risk


SIRS


Temperature: 


Pulse: 112 


Respiratory Rate: 20


 


Laboratory Tests


2/20/20 13:00: White Blood Count 11.1H


Blood Pressure 125 /80 


Mean: 95


 


Laboratory Tests


2/20/20 13:00: 


Creatinine 0.67, Platelet Count 370, Total Bilirubin 0.3








Results/Orders


Lab Results





Laboratory Tests








Test


 2/20/20


13:00 Range/Units


 


 


White Blood Count


 11.1 H


 4.3-11.0


10^3/uL


 


Red Blood Count


 4.83 


 4.35-5.85


10^6/uL


 


Hemoglobin 15.1  11.5-16.0  G/DL


 


Hematocrit 43  35-52  %


 


Mean Corpuscular Volume 90  80-99  FL


 


Mean Corpuscular Hemoglobin 31  25-34  PG


 


Mean Corpuscular Hemoglobin


Concent 35 


 32-36  G/DL





 


Red Cell Distribution Width 11.9  10.0-14.5  %


 


Platelet Count


 370 


 130-400


10^3/uL


 


Mean Platelet Volume 9.0  7.4-10.4  FL


 


Neutrophils (%) (Auto) 70  42-75  %


 


Lymphocytes (%) (Auto) 23  12-44  %


 


Monocytes (%) (Auto) 6  0-12  %


 


Eosinophils (%) (Auto) 1  0-10  %


 


Basophils (%) (Auto) 0  0-10  %


 


Neutrophils # (Auto) 7.7  1.8-7.8  X 10^3


 


Lymphocytes # (Auto) 2.3  1.0-4.0  X 10^3


 


Monocytes # (Auto) 0.6  0.0-1.0  X 10^3


 


Eosinophils # (Auto)


 0.1 


 0.0-0.3


10^3/uL


 


Basophils # (Auto)


 0.0 


 0.0-0.1


10^3/uL


 


Sodium Level 139  135-145  MMOL/L


 


Potassium Level 4.4  3.6-5.0  MMOL/L


 


Chloride Level 101    MMOL/L


 


Carbon Dioxide Level 26  21-32  MMOL/L


 


Anion Gap 12  5-14  MMOL/L


 


Blood Urea Nitrogen 12  7-18  MG/DL


 


Creatinine


 0.67 


 0.60-1.30


MG/DL


 


Estimat Glomerular Filtration


Rate > 60 


  





 


BUN/Creatinine Ratio 18   


 


Glucose Level 100    MG/DL


 


Calcium Level 9.7  8.5-10.1  MG/DL


 


Corrected Calcium   8.5-10.1  MG/DL


 


Total Bilirubin 0.3  0.1-1.0  MG/DL


 


Aspartate Amino Transf


(AST/SGOT) 23 


 5-34  U/L





 


Alanine Aminotransferase


(ALT/SGPT) 19 


 0-55  U/L





 


Alkaline Phosphatase 83    U/L


 


Troponin I < 0.30  <0.30  NG/ML


 


Total Protein 7.4  6.4-8.2  GM/DL


 


Albumin 4.6 H 3.2-4.5  GM/DL


 


Lipase 30  8-78  U/L








My Orders





Orders - GUSTAVO BROWN MD


Cbc With Automated Diff (2/20/20 12:40)


Comprehensive Metabolic Panel (2/20/20 12:40)


Troponin I Fs (2/20/20 12:40)


Ekg Tracing (2/20/20 12:40)


Chest 1 View Ap/Pa Only (2/20/20 12:40)


Ua Culture If Indicated (2/20/20 12:40)


Hcg,Qualitative Urine (2/20/20 12:40)


Ed Iv/Invasive Line Start (2/20/20 12:40)


Ns Iv 1000 Ml (Sodium Chloride 0.9%) (2/20/20 12:40)


Lorazepam Tablet (Ativan Tablet) (2/20/20 12:40)


Famotidine Tablet (Pepcid Tablet) (2/20/20 12:45)


Meclizine Tablet (Antivert Tablet) (2/20/20 12:45)


Lipase (2/20/20 12:50)


Acetaminophen Tablet/Caplet (Tylenol  T (2/20/20 13:45)





Medications Given in ED





Current Medications








 Medications  Dose


 Ordered  Sig/Justice


 Route  Start Time


 Stop Time Status Last Admin


Dose Admin


 


 Acetaminophen  975 mg  ONCE  ONCE


 PO  2/20/20 13:45


 2/20/20 13:46 DC 2/20/20 13:54


975 MG


 


 Famotidine  20 mg  ONCE  ONCE


 PO  2/20/20 12:45


 2/20/20 12:46 DC 2/20/20 13:09


20 MG


 


 Meclizine HCl  25 mg  ONCE  ONCE


 PO  2/20/20 12:45


 2/20/20 12:46 DC 2/20/20 13:09


25 MG








Vital Signs/I&O











 2/20/20





 12:29


 


Temp 37.1


 


Pulse 112


 


Resp 20


 


B/P (MAP) 125/80 (95)


 


Pulse Ox 95


 


O2 Delivery Room Air





Capillary Refill : Less Than 3 Seconds








Blood Pressure Mean:                    95








Progress Note :  


   Time:  13:29


Progress Note


Vital signs and clinical examination generally reassuring. Well-appearing female

who presents for evaluation of shakiness, tingling of bilateral hands, nausea 

with 2 episodes of nonbloody vomiting and lightheadedness, all with onset after 

receiving what was apparently a troubling phone call from her children's school.

Will place IV and give IV fluids and medication for nausea, anxiety and 

discomfort as noted (patient is not actually allergic to APAP) and will then 

reevaluate. If symptoms improve and workup is reassuring, plan will be for 

discharge home with medication for symptomatically management and referral for 

very close follow-up with primary care. Patient and her sister understand and 

agree with this plan of care.





Update 1400: Large workup as above is without evidence of acute process. Patient

is status post hysterectomy so no pregnancy testing obtained. Patient feels 

much, much better upon reassessment and states that all of her presenting 

symptoms are resolved. She feels well and would like to go home. No evidence of 

an emergency medical condition requiring further testing or treatment at this 

time. We will proceed with discharge home. Patient understands that if she feels

worse is that of better or develops other new symptoms of concern that she 

should return to the emergency department immediately for reevaluation. 

Otherwise she is to follow up with her primary doctor in the next 2-4 days. All 

questions are answered.





ECG


Comment


Sinus rhythm, rate 90, no acute ST elevation or depression, , QRS 77, QTC 

440, EP interpretation.





Diagnostic Imaging





Comments


Date of Exam:02/20/20





CHEST 1 VIEW AP/PA ONLY








Indication: Vomiting x1 hour





Portable chest 1:35 PM





Heart size and pulmonary vascularity are normal. Lungs are clear.


There are no effusions or pneumothoraces.





IMPRESSION: Negative chest





Dictated by: 





  Dictated on workstation # [id number]





Departure


Impression





   Primary Impression:  


   Lightheadedness


   Additional Impressions:  


   Acute epigastric pain


   Vomiting


   Qualified Codes:  R11.2 - Nausea with vomiting, unspecified


Disposition:  01 HOME, SELF-CARE


Condition:  Improved





Departure-Patient Inst.


Referrals:  


SHASHI TAPIA MD (PCP/Family)


Primary Care Physician





Add. Discharge Instructions:  


Please follow up with your primary physician in the next 2-4 days as discussed 

for a reevaluation of your symptoms. Drink plenty of fluids and get plenty of 

rest. Use the medication as prescribed for nausea. Return to the emergency 

department right away with worsening symptoms or other new concerns.


Scripts


Ondansetron (Ondansetron Odt) 4 Mg Tab.rapdis


4 MG PO Q8H for na, #10 TAB


   Prov: GUSTAVO BROWN MD         2/20/20


Work/School Note:  Family Work Note   Patient Received Medical Care In the 

Emergency Department On:  Feb 20, 2020


   Patient Will Be Able to Return to Work/School On:  Feb 21, 2020


   Patient Restrictions:  None











GUSTAVO BROWN MD              Feb 20, 2020 13:28

## 2021-09-11 ENCOUNTER — HOSPITAL ENCOUNTER (EMERGENCY)
Dept: HOSPITAL 75 - ER FS | Age: 36
Discharge: HOME | End: 2021-09-11
Payer: SELF-PAY

## 2021-09-11 VITALS — SYSTOLIC BLOOD PRESSURE: 113 MMHG | DIASTOLIC BLOOD PRESSURE: 81 MMHG

## 2021-09-11 DIAGNOSIS — J44.9: ICD-10-CM

## 2021-09-11 DIAGNOSIS — R07.81: Primary | ICD-10-CM

## 2021-09-11 PROCEDURE — 71101 X-RAY EXAM UNILAT RIBS/CHEST: CPT

## 2021-09-11 NOTE — DIAGNOSTIC IMAGING REPORT
INDICATION: Right-sided chest pain



COMPARISON: 04/13/2019



TECHNIQUE: 3 radiographs of the chest and right-sided ribs dated

09/11/2021.



FINDINGS: The cardiac silhouette is within normal limits in size.

No significant pulmonary vascular congestion. The lungs are

clear. No pleural effusion. No pneumothorax. Surgical clips

within right upper quadrant in the abdomen. No displaced or

healing rib fracture.



IMPRESSION: No displaced or healing rib fracture.



No significant pleural effusion or pneumothorax.



Dictated by: 



  Dictated on workstation # [id number]

## 2021-09-11 NOTE — ED GENERAL
General


Stated Complaint:  RT LUNG PAIN


Source of Information:  Patient





History of Present Illness


Date Seen by Provider:  Sep 11, 2021


Time Seen by Provider:  11:51


Initial Comments


36-year-old female presenting with complaints of pain on the right side of her 

chest.  This morning she had rolled over in bed and it woke her up with pain in 

her chest.  She has been at work all day and has not taken anything for pain.  

The pain is worse when she takes a deep breath.  She denies any fall or injury 

to her chest.  She has pain that goes straight through to her back when she 

takes a deep breath.  She denies any increase in her cough from her baseline.  

She has no shortness of breath but is breathing more shallow because of the rib 

and chest pain when breathing deep.  She denies any fever or chills.


Timing/Duration:  4-6 Hours


Severity:  Severe


Modifying Factors:  worse with Movement (And deep breaths)


Associated Systoms:  Chest Pain (Right-sided); No Cough (No more than her 

baseline), No Diaphoresis, No Fever/Chills; Headaches (Chronic migraines and no 

different than usual); No Loss of Appetite, No Malaise, No Nausea/Vomiting, No 

Rash, No Seizure, No Shortness of Air, No Syncope, No Weakness





Allergies and Home Medications


Allergies


Coded Allergies:  


     acetaminophen (Verified  Allergy, Unknown, 3/28/19)


   PT DENIES ALLERGY, CITING, "I CAN TAKE LORCET, I CANNOT


   TAKE PERCOSET.


Uncoded Allergies:  


     DEPROVERA (Allergy, Unknown, 3/28/19)


     oxycodone (Adverse Reaction, Intermediate, 13)


   pt states, "i'm not really allergic to tylenol, i am


   allergic to percoset.


   WHEN I TOOK PERCOSET I BECAME VERY ANXIOUS, AND NERVOUS,


   WAS CLIMBING THE WALLS, BUT I CAN TAKE LORCET WITH NO


   PROBLEM, SO IT'S NOT TYLENOL. IT'S OXYCODONE THAT I AM


   ALLERGIC TO.





Patient Home Medication List


Home Medication List Reviewed:  Yes


Cyclobenzaprine HCl (Cyclobenzaprine HCl) 10 Mg Tablet, 10 MG PO Q8H PRN for 

SPASMS


   Prescribed by: LEIGH COATES on 21 1327


Discontinued Medications


Hydrocodone Bit/Acetaminophen (Norco 5-325 Tablet) 1 Each Tablet, 1 EA PO Q6H 

PRN for MILD PAIN


   Discontinued Reason: Referral/FU Appt-Addtl


   Prescribed by: BRUCE CHI on 14 1737


   Last Action: Discontinued


Ibuprofen (Ibuprofen) 800 Mg Tablet, 800 MG PO q8h PRN for PAIN, (Reported)


   Discontinued Reason: Referral/FU Appt-Addtl


   Entered as Reported by: RILEY NEW on 14 1728


   Last Action: Discontinued


Ondansetron (Ondansetron Odt) 4 Mg Tab.rapdis, 4 MG PO Q8H


   Discontinued Reason: Referral/FU Appt-Addtl


   Prescribed by: GUSTAVO BROWN on 20 1406


   Last Action: Discontinued





Review of Systems


Review of Systems


Constitutional:  No chills, No fever


EENTM:  no symptoms reported


Respiratory:  see HPI


Cardiovascular:  see HPI


Gastrointestinal:  No abdominal pain, No nausea, No vomiting


Genitourinary:  no symptoms reported


Musculoskeletal:  no symptoms reported


Skin:  No rash


Psychiatric/Neurological:  See HPI





Past Medical-Social-Family Hx


Seasonal Allergies


Seasonal Allergies:  No





Past Medical History


Surgeries:  Yes


Respiratory:  Yes (ASTHMA AS A CHILD)


Asthma, COPD


Cardiac:  Yes


Palpitations


Neurological:  Yes


Headaches /Migraines


Female Reproductive Disorders:  Denies


GYN History:  Hysterectomy


Sexually Transmitted Disease:  No


HIV/AIDS:  No


Genitourinary:  Yes


Bladder Infection, Kidney Stones, UTI-Chronic


Gastrointestinal:  Yes


Chronic Diarrhea, Gall Bladder Disease


Musculoskeletal:  Yes (CHRONIC BILATERAL HIP PAIN )


Chronic Back Pain


Endocrine:  No


HEENT:  No


Cancer:  Yes


Cervical, Uterine


Psychosocial:  Yes (STATES HAS APPROX 2 PANIC ATTACKS PER MONTH)


Anxiety, Depression


Integumentary:  No


Blood Disorders:  Yes (CHRONIC ANEMIA, PER PATIENT)


Adverse Reaction/Blood Tranf:  No





Family Medical History


No Pertinent Family Hx





Physical Exam


Vital Signs





Vital Signs - First Documented








 21





 11:55


 


Temp 36.3


 


Pulse 91


 


Resp 18


 


B/P (MAP) 113/81 (92)


 


Pulse Ox 98


 


O2 Delivery Room Air





Capillary Refill :


Height, Weight, BMI


Height: 5'4.00"


Weight: 150lbs. oz. 68.923928ku; 22.00 BMI


Method:Stated


General Appearance:  WD/WN, Mild Distress


HEENT:  PERRL/EOMI


Neck:  Full Range of Motion, Normal Inspection, Non Tender, Supple


Respiratory:  Chest Non Tender, Lungs Clear, Normal Breath Sounds, No Accessory 

Muscle Use, No Respiratory Distress


Cardiovascular:  Regular Rate, Rhythm, Normal Peripheral Pulses


Gastrointestinal:  Normal Bowel Sounds, No Pulsatile Mass, Non Tender, Soft


Rectal:  Deferred


Back:  No Vertebral Tenderness


Extremity:  Normal Capillary Refill, Normal Inspection, No Pedal Edema


Neurologic/Psychiatric:  Alert, Oriented x3, CNs II-XII Norm as Tested


Skin:  Normal Color, Warm/Dry





Progress/Results/Core Measures


Suspected Sepsis


SIRS


Temperature: 


Pulse:  


Respiratory Rate: 


 


Blood Pressure  / 


Mean:





Results/Orders


My Orders





Orders - LEIGH COATES MD


Ketorolac Injection (Toradol Injection) (21 12:03)


Ribs/Unilateral With Chest (21 12:03)





Vital Signs/I&O











 21





 11:55


 


Temp 36.3


 


Pulse 91


 


Resp 18


 


B/P (MAP) 113/81 (92)


 


Pulse Ox 98


 


O2 Delivery Room Air





Capillary Refill :


Progress Note #1:  


Progress Note


Try Toradol for pain and inflammation.  Obtain x-rays of the ribs and chest to 

evaluate for rib fracture or pneumonia or pneumothorax or pleural effusion.  

Oxygen saturation is 98% on room air.


Progress Note #2:  


Progress Note


On recheck pt was having slight improvement in pain. Reviewed with her the films

did not show any acute infection, pneumothorax, fluid collection or mass. Will 

try treating with nsaids and muscle relaxer for possible pulled muscle and 

pleurisy. counseled on results and plan.





Diagnostic Imaging





   Diagonstic Imaging:  Xray


   Plain Films/CT/US/NM/MRI:  chest (With ribs)


Comments


                 [facility] [place], [place]





NAME:   CASSIDY STOVER MARTÍN


MED REC#:   [id number]


ACCOUNT#:   [id number]


PT STATUS:   DEP ER


:   1985


PHYSICIAN:   LEIGH COATES MD


ADMIT DATE:   21/ER FS


                                  ***Signed***


Date of Exam:21





RIBS/UNILATERAL WITH CHEST








INDICATION: Right-sided chest pain





COMPARISON: 2019





TECHNIQUE: 3 radiographs of the chest and right-sided ribs dated


2021.





FINDINGS: The cardiac silhouette is within normal limits in size.


No significant pulmonary vascular congestion. The lungs are


clear. No pleural effusion. No pneumothorax. Surgical clips


within right upper quadrant in the abdomen. No displaced or


healing rib fracture.





IMPRESSION: No displaced or healing rib fracture.





No significant pleural effusion or pneumothorax.





Dictated by: 





  Dictated on workstation # [id number]








Dict:   21 1309


Trans:   21 1425


CV [id number]





Interpreted by:     JULIO CESAR MONTIEL MD


Electronically signed by: JULIO CESAR MONTIEL MD 21 1425


   Reviewed:  Reviewed by Me





Departure


Impression





   Primary Impression:  


   Pleuritic chest pain


Disposition:   HOME, SELF-CARE


Condition:  Stable





Departure-Patient Inst.


Decision time for Depature:  13:27


Referrals:  


[facility] (PCP/Family)


Primary Care Physician


Patient Instructions:  Pleuritic Chest Pain ED





Add. Discharge Instructions:  


Take Ibuprofen up to 800 mg every 8 hours as needed for pain and inflammation. 


Take the Cyclobenzaprine (Flexeril) 10 mg up to every 8 hours as needed for 

spasm and pain in right chest.





Stay well hydrated and get plenty of rest.





Follow up with clinic or return if not seeing improvement by Tuesday or 

Wednesday or if you have worsening symptoms.


Scripts


Cyclobenzaprine HCl (Cyclobenzaprine HCl) 10 Mg Tablet


10 MG PO Q8H PRN for SPASMS for 10 Days, #30 TAB 0 Refills


   Prov: LEIGH COATES MD         21


Work/School Note:  Work Release Form   Date Seen in the Emergency Department:  

Sep 11, 2021


   Return to Work:  Sep 12, 2021


   Restrictions:  No Restrictions











LEIGH COATES MD               Sep 11, 2021 12:03

## 2022-02-25 NOTE — DIAGNOSTIC IMAGING REPORT
Indication: Chronic back pain radiating into the right leg.



Time of exam: 9:22 AM



3 views of the lumbar spine show normal curvature and alignment.

Vertebral body heights and disc spaces are well-maintained. No

fracture or subluxation is seen.



Impression: No acute bony abnormality is detected.



Dictated by: 



  Dictated on workstation # [id number]
no

## 2022-04-06 ENCOUNTER — HOSPITAL ENCOUNTER (EMERGENCY)
Dept: HOSPITAL 75 - ER FS | Age: 37
Discharge: HOME | End: 2022-04-06
Payer: COMMERCIAL

## 2022-04-06 VITALS — SYSTOLIC BLOOD PRESSURE: 132 MMHG | DIASTOLIC BLOOD PRESSURE: 79 MMHG

## 2022-04-06 VITALS — WEIGHT: 154.1 LBS | HEIGHT: 65 IN | BODY MASS INDEX: 25.67 KG/M2

## 2022-04-06 DIAGNOSIS — Z72.0: ICD-10-CM

## 2022-04-06 DIAGNOSIS — J44.1: Primary | ICD-10-CM

## 2022-04-06 LAB
ALBUMIN SERPL-MCNC: 4.6 GM/DL (ref 3.2–4.5)
ALP SERPL-CCNC: 82 U/L (ref 40–136)
ALT SERPL-CCNC: 39 U/L (ref 0–55)
APTT BLD: 27 SEC (ref 24–35)
BASOPHILS # BLD AUTO: 0.1 10^3/UL (ref 0–0.1)
BASOPHILS NFR BLD AUTO: 1 % (ref 0–10)
BILIRUB SERPL-MCNC: 0.3 MG/DL (ref 0.1–1)
BUN/CREAT SERPL: 13
CALCIUM SERPL-MCNC: 9.9 MG/DL (ref 8.5–10.1)
CHLORIDE SERPL-SCNC: 103 MMOL/L (ref 98–107)
CO2 SERPL-SCNC: 26 MMOL/L (ref 21–32)
CREAT SERPL-MCNC: 0.67 MG/DL (ref 0.6–1.3)
EOSINOPHIL # BLD AUTO: 0.1 10^3/UL (ref 0–0.3)
EOSINOPHIL NFR BLD AUTO: 1 % (ref 0–10)
GFR SERPLBLD BASED ON 1.73 SQ M-ARVRAT: 114 ML/MIN
GLUCOSE SERPL-MCNC: 104 MG/DL (ref 70–105)
HCT VFR BLD CALC: 45 % (ref 35–52)
HGB BLD-MCNC: 15.8 G/DL (ref 11.5–16)
INR PPP: 0.9 (ref 0.8–1.4)
LYMPHOCYTES # BLD AUTO: 3.2 10^3/UL (ref 1–4)
LYMPHOCYTES NFR BLD AUTO: 32 % (ref 12–44)
MAGNESIUM SERPL-MCNC: 1.9 MG/DL (ref 1.6–2.4)
MANUAL DIFFERENTIAL PERFORMED BLD QL: NO
MCH RBC QN AUTO: 31 PG (ref 25–34)
MCHC RBC AUTO-ENTMCNC: 35 G/DL (ref 32–36)
MCV RBC AUTO: 90 FL (ref 80–99)
MONOCYTES # BLD AUTO: 0.8 10^3/UL (ref 0–1)
MONOCYTES NFR BLD AUTO: 8 % (ref 0–12)
NEUTROPHILS # BLD AUTO: 5.8 10^3/UL (ref 1.8–7.8)
NEUTROPHILS NFR BLD AUTO: 58 % (ref 42–75)
PLATELET # BLD: 315 10^3/UL (ref 130–400)
PMV BLD AUTO: 8.9 FL (ref 9–12.2)
POTASSIUM SERPL-SCNC: 3.5 MMOL/L (ref 3.6–5)
PROT SERPL-MCNC: 7.1 GM/DL (ref 6.4–8.2)
PROTHROMBIN TIME: 12.3 SEC (ref 12.2–14.7)
SODIUM SERPL-SCNC: 140 MMOL/L (ref 135–145)
WBC # BLD AUTO: 10 10^3/UL (ref 4.3–11)

## 2022-04-06 PROCEDURE — 85379 FIBRIN DEGRADATION QUANT: CPT

## 2022-04-06 PROCEDURE — 85025 COMPLETE CBC W/AUTO DIFF WBC: CPT

## 2022-04-06 PROCEDURE — 80053 COMPREHEN METABOLIC PANEL: CPT

## 2022-04-06 PROCEDURE — 85730 THROMBOPLASTIN TIME PARTIAL: CPT

## 2022-04-06 PROCEDURE — 84484 ASSAY OF TROPONIN QUANT: CPT

## 2022-04-06 PROCEDURE — 83880 ASSAY OF NATRIURETIC PEPTIDE: CPT

## 2022-04-06 PROCEDURE — 36415 COLL VENOUS BLD VENIPUNCTURE: CPT

## 2022-04-06 PROCEDURE — 85610 PROTHROMBIN TIME: CPT

## 2022-04-06 PROCEDURE — 93005 ELECTROCARDIOGRAM TRACING: CPT

## 2022-04-06 PROCEDURE — 93041 RHYTHM ECG TRACING: CPT

## 2022-04-06 PROCEDURE — 83735 ASSAY OF MAGNESIUM: CPT

## 2022-04-06 NOTE — ED CHEST PAIN
General


Stated Complaint:  CHEST PAIN


Source:  patient


Exam Limitations:  no limitations





History of Present Illness


Date Seen by Provider:  Apr 6, 2022


Time Seen by Provider:  12:18


Initial Comments


37-year-old female with past medical history of COPD that still smokes coming in

as referral from urgent care due to chest pain.  Has been constant, pressure-

like pain on the right side of her chest since last night.  Has some mild 

shortness of breath with it.  At the urgent care had an EKG, chest x-ray, 

breathing treatment.  She said she felt significantly better after the breathing

treatment.  She is taken full dose aspirin the past 2 days.  She denies any 

cardiac history or prior history of DVT or PE.  No recent surgery, no recent 

long travel, no hemoptysis, no fever, no leg swelling or pain.  She says she 

does have a productive cough more so in the morning.  She is otherwise denying 

any other acute complaints





Allergies and Home Medications


Allergies


Coded Allergies:  


     acetaminophen (Verified  Allergy, Unknown, 3/28/19)


   PT DENIES ALLERGY, CITING, "I CAN TAKE LORCET, I CANNOT


   TAKE PERCOSET.


Uncoded Allergies:  


     DEPROVERA (Allergy, Unknown, 3/28/19)


     oxycodone (Adverse Reaction, Intermediate, 8/1/13)


   pt states, "i'm not really allergic to tylenol, i am


   allergic to percoset.


   WHEN I TOOK PERCOSET I BECAME VERY ANXIOUS, AND NERVOUS,


   WAS CLIMBING THE WALLS, BUT I CAN TAKE LORCET WITH NO


   PROBLEM, SO IT'S NOT TYLENOL. IT'S OXYCODONE THAT I AM


   ALLERGIC TO.





Patient Home Medication List


Home Medication List Reviewed:  Yes


Cyclobenzaprine HCl (Cyclobenzaprine HCl) 10 Mg Tablet, 10 MG PO Q8H PRN for 

SPASMS


   Prescribed by: LEIGH COATES on 9/11/21 5157





Review of Systems


Review of Systems


Constitutional:  No chills, No fever


EENTM:  No Blurred Vision


Respiratory:  Cough, Shortness of Air


Cardiovascular:  Chest Pain


Gastrointestinal:  Denies Abdominal Pain, Denies Nausea


Genitourinary:  No Symptoms Reported


Musculoskeletal:  no symptoms reported


Skin:  no symptoms reported


Psychiatric/Neurological:  No Symptoms Reported


Endocrine:  No Symptoms Reported


Hematologic/Lymphatic:  No Symptoms Reported





All Other Systems Reviewed


Negative Unless Noted:  Yes





Past Medical-Social-Family Hx


Patient Social History


Tobacco Use?:  Yes


Tobacco type used:  Cigarettes





Seasonal Allergies


Seasonal Allergies:  No





Past Medical History


Surgery/Hospitalization HX:  


PSH: Hyst/GB/Appy/Kidney stone surgery x 2/Ureteral stent   PMH: Migraines/COPD


Surgeries:  Yes


Respiratory:  Yes (ASTHMA AS A CHILD)


Asthma, COPD


Cardiac:  Yes


Palpitations


Neurological:  Yes


Headaches /Migraines


Female Reproductive Disorders:  Denies


GYN History:  Hysterectomy


Sexually Transmitted Disease:  No


HIV/AIDS:  No


Genitourinary:  Yes


Bladder Infection, Kidney Stones, UTI-Chronic


Gastrointestinal:  Yes


Chronic Diarrhea, Gall Bladder Disease


Musculoskeletal:  Yes (CHRONIC BILATERAL HIP PAIN )


Chronic Back Pain


Endocrine:  No


HEENT:  No


Cancer:  Yes


Cervical, Uterine


Psychosocial:  Yes (STATES HAS APPROX 2 PANIC ATTACKS PER MONTH)


Anxiety, Depression


Integumentary:  No


Blood Disorders:  Yes (CHRONIC ANEMIA, PER PATIENT)


Adverse Reaction/Blood Tranf:  No





Family Medical History


No Pertinent Family Hx





Physical Exam


Vital Signs





Vital Signs - First Documented








 4/6/22





 12:15


 


Temp 36.4


 


Pulse 94


 


Resp 31


 


B/P (MAP) 132/79 (96)


 


Pulse Ox 98


 


O2 Delivery Room Air





Capillary Refill :


Height, Weight, BMI


Height: 5'4.00"


Weight: 150lbs. oz. 68.838156bj; 22.00 BMI


Method:Stated


General Appearance:  No Apparent Distress, WD/WN


HEENT:  PERRL/EOMI, Normal ENT Inspection, Pharynx Normal


Neck:  Full Range of Motion, Normal Inspection, Non Tender, Supple


Respiratory:  Chest Non Tender, Lungs Clear, Normal Breath Sounds, No Accessory 

Muscle Use, No Respiratory Distress


Cardiovascular:  Regular Rate, Rhythm, No Edema, Normal Peripheral Pulses


Gastrointestinal:  Normal Bowel Sounds, Non Tender, Soft; No Distended, No 

Guarding


Extremity:  Normal Capillary Refill, Normal Inspection, Normal Range of Motion, 

Non Tender, No Calf Tenderness, No Pedal Edema


Neurologic/Psychiatric:  Alert, No Motor/Sensory Deficits, Normal Mood/Affect


Skin:  Normal Color, Warm/Dry


Lymphatic:  No Adenopathy





Progress/Results/Core Measures


Results/Orders


Lab Results





Laboratory Tests








Test


 4/6/22


12:25 Range/Units


 


 


White Blood Count


 10.0 


 4.3-11.0


10^3/uL


 


Red Blood Count


 5.06 


 3.80-5.11


10^6/uL


 


Hemoglobin 15.8  11.5-16.0  g/dL


 


Hematocrit 45  35-52  %


 


Mean Corpuscular Volume 90  80-99  fL


 


Mean Corpuscular Hemoglobin 31  25-34  pg


 


Mean Corpuscular Hemoglobin


Concent 35 


 32-36  g/dL





 


Red Cell Distribution Width 12.5  10.0-14.5  %


 


Platelet Count


 315 


 130-400


10^3/uL


 


Mean Platelet Volume 8.9 L 9.0-12.2  fL


 


Immature Granulocyte % (Auto) 1   %


 


Neutrophils (%) (Auto) 58  42-75  %


 


Lymphocytes (%) (Auto) 32  12-44  %


 


Monocytes (%) (Auto) 8  0-12  %


 


Eosinophils (%) (Auto) 1  0-10  %


 


Basophils (%) (Auto) 1  0-10  %


 


Neutrophils # (Auto)


 5.8 


 1.8-7.8


10^3/uL


 


Lymphocytes # (Auto)


 3.2 


 1.0-4.0


10^3/uL


 


Monocytes # (Auto)


 0.8 


 0.0-1.0


10^3/uL


 


Eosinophils # (Auto)


 0.1 


 0.0-0.3


10^3/uL


 


Basophils # (Auto)


 0.1 


 0.0-0.1


10^3/uL


 


Immature Granulocyte # (Auto)


 0.1 


 0.0-0.1


10^3/uL


 


Prothrombin Time 12.3  12.2-14.7  SEC


 


INR Comment 0.9  0.8-1.4  


 


Activated Partial


Thromboplast Time 27 


 24-35  SEC





 


D-Dimer


 0.23 


 0.00-0.49


UG/ML


 


Sodium Level 140  135-145  MMOL/L


 


Potassium Level 3.5 L 3.6-5.0  MMOL/L


 


Chloride Level 103    MMOL/L


 


Carbon Dioxide Level 26  21-32  MMOL/L


 


Anion Gap 11  5-14  MMOL/L


 


Blood Urea Nitrogen 9  7-18  MG/DL


 


Creatinine


 0.67 


 0.60-1.30


MG/DL


 


Estimat Glomerular Filtration


Rate 114 


  





 


BUN/Creatinine Ratio 13   


 


Glucose Level 104    MG/DL


 


Calcium Level 9.9  8.5-10.1  MG/DL


 


Corrected Calcium   8.5-10.1  MG/DL


 


Magnesium Level 1.9  1.6-2.4  MG/DL


 


Total Bilirubin 0.3  0.1-1.0  MG/DL


 


Aspartate Amino Transf


(AST/SGOT) 19 


 5-34  U/L





 


Alanine Aminotransferase


(ALT/SGPT) 39 


 0-55  U/L





 


Alkaline Phosphatase 82    U/L


 


Troponin I < 0.30  <0.30  NG/ML


 


Pro-B-Type Natriuretic Peptide 128.5 H <75.0  PG/ML


 


Total Protein 7.1  6.4-8.2  GM/DL


 


Albumin 4.6 H 3.2-4.5  GM/DL








My Orders





Orders - EVELYN IBANEZ MD


Cbc With Automated Diff (4/6/22 12:25)


Magnesium (4/6/22 12:25)


Ekg Tracing (4/6/22 12:25)


Comprehensive Metabolic Panel (4/6/22 12:25)


Protime With Inr (4/6/22 12:25)


Partial Thromboplastin Time (4/6/22 12:25)


O2 (4/6/22 12:25)


Monitor-Rhythm Ecg Trace Only (4/6/22 12:25)


Ed Iv/Invasive Line Start (4/6/22 12:25)


Fibrin Degradation Products (4/6/22 12:25)


Troponin I Fs (4/6/22 12:25)


Probnp Fs (4/6/22 12:25)


Antacid  Suspension (Mylanta  Suspension (4/6/22 12:30)


Outside Films For Comparison (4/6/22 )





Medications Given in ED





Current Medications








 Medications  Dose


 Ordered  Sig/Justice


 Route  Start Time


 Stop Time Status Last Admin


Dose Admin


 


 Al Hydrox/Mg


 Hydrox/Simethicone  30 ml  ONCE  ONCE


 PO  4/6/22 12:30


 4/6/22 12:31 DC 4/6/22 12:46


30 ML








Vital Signs/I&O











 4/6/22





 12:15


 


Temp 36.4


 


Pulse 94


 


Resp 31


 


B/P (MAP) 132/79 (96)


 


Pulse Ox 98


 


O2 Delivery Room Air











Progress


Progress Note :  


Progress Note


37-year-old female with above history coming in due to chest tightness and 

shortness of breath.  ABCs were intact and vitals were stable on presentation.  

Physical exam with some wheezing which improved after she had received the 

DuoNeb earlier today.  She says a lot of her symptoms in fact improved after the

DuoNeb, and she does not have an inhaler at home.  EKG without ischemic changes.

 An IV was placed and basic labs including cardiac biomarkers ordered.  Troponin

and D-dimer normal.  She is otherwise low risk for a PE, and given the normal D-

dimer, further work-up not necessary at this time.  I reviewed the chest x-ray 

done at the urgent care which did not show any significant acute abnormality 

including no pneumonia or pneumothorax.  Given the more productive cough with 

her COPD history, we will treat this like a COPD exacerbation.  Vitals are 

stable, and I believe she is appropriate for outpatient management.  She was 

sent home with strict return precautions


Initial ECG Impression Date:  Apr 6, 2022


Initial ECG Impression Time:  12:20


Initial ECG Rate:  87


Initial ECG Rhythm:  Normal Sinus


Comment


Narrow QRS, normal axis, no significant ST changes or T wave abnormalities





Departure


Impression





   Primary Impression:  


   COPD exacerbation


   Additional Impression:  


   Chest tightness


Disposition:  01 HOME, SELF-CARE


Condition:  Stable





Departure-Patient Inst.


Decision time for Depature:  13:32


Referrals:  


[facility] (PCP/Family)


Primary Care Physician








LILIYA HANSON JR, MD


Patient Instructions:  COPD Exacerbation, Adult ED





Add. Discharge Instructions:  


You were seen in the emergency department for your chest tightness and shortness

of breath.  Your work-up is reassuring and you do not have any evidence of heart

attack or blood clot.  This is likely related to your COPD.  We will start you 

on some antibiotics, steroids, and an inhaler.  I do want you to call Dr. Maxi hannah, the cardiologist that comes to [place] once a week, otherwise he is in 

[place].  Please follow-up with him for further evaluation to be sure that 

there is nothing significant going on with your heart, as he can do test that we

are unable to do.


Scripts


Albuterol Sulfate (VENTOLIN HFA) 1 Puff Puff


2 PUFF INH Q4H PRN for DYSPNEA for 30 Days, #1 EA


   1 PUFF = 90 MCG


   Prov: EVELYN IBANEZ MD         4/6/22 


Methylprednisolone (Methylprednisolone Dose Pack) 4 Mg Tab.ds.pk


4 MG PO UD for 6 Days, #21 PKG


   PER DOSE PACK INSTRUCTIONS


   Prov: EVELYN IBANEZ MD         4/6/22 


Doxycycline Hyclate (Doxycycline Hyclate) 100 Mg Tablet


100 MG PO BID for 7 Days, #14 TAB 0 Refills


   Prov: EVELYN IBANEZ MD         4/6/22


Work/School Note:  Work Release Form   Date Seen in the Emergency Department:  

Apr 6, 2022


   Return to Work:  Apr 7, 2022


   Restrictions:  No Restrictions











EVELYN IBANEZ MD           Apr 6, 2022 12:29